# Patient Record
Sex: FEMALE | Race: WHITE | NOT HISPANIC OR LATINO | Employment: STUDENT | URBAN - METROPOLITAN AREA
[De-identification: names, ages, dates, MRNs, and addresses within clinical notes are randomized per-mention and may not be internally consistent; named-entity substitution may affect disease eponyms.]

---
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
## 2025-01-06 ENCOUNTER — APPOINTMENT (OUTPATIENT)
Dept: BEHAVIORAL HEALTH | Facility: CLINIC | Age: 20
End: 2025-01-06
Payer: COMMERCIAL

## 2025-01-07 ENCOUNTER — APPOINTMENT (OUTPATIENT)
Dept: BEHAVIORAL HEALTH | Facility: CLINIC | Age: 20
End: 2025-01-07
Payer: COMMERCIAL

## 2025-01-21 ENCOUNTER — APPOINTMENT (OUTPATIENT)
Dept: BEHAVIORAL HEALTH | Facility: CLINIC | Age: 20
End: 2025-01-21
Payer: COMMERCIAL

## 2025-03-10 ENCOUNTER — APPOINTMENT (OUTPATIENT)
Dept: BEHAVIORAL HEALTH | Facility: CLINIC | Age: 20
End: 2025-03-10
Payer: COMMERCIAL

## 2025-03-10 DIAGNOSIS — F32.89 OTHER SPECIFIED DEPRESSIVE EPISODES: ICD-10-CM

## 2025-03-10 DIAGNOSIS — F41.1 GAD (GENERALIZED ANXIETY DISORDER): Primary | ICD-10-CM

## 2025-03-10 PROCEDURE — 1036F TOBACCO NON-USER: CPT | Performed by: PSYCHIATRY & NEUROLOGY

## 2025-03-10 PROCEDURE — 99214 OFFICE O/P EST MOD 30 MIN: CPT | Performed by: PSYCHIATRY & NEUROLOGY

## 2025-03-10 RX ORDER — BUPROPION HYDROCHLORIDE 300 MG/1
300 TABLET ORAL EVERY MORNING
Qty: 90 TABLET | Refills: 0 | Status: SHIPPED | OUTPATIENT
Start: 2025-03-10 | End: 2025-06-08

## 2025-03-10 RX ORDER — FLUOXETINE 20 MG/1
TABLET ORAL
Qty: 33 TABLET | Refills: 0 | Status: SHIPPED | OUTPATIENT
Start: 2025-03-10 | End: 2025-04-15

## 2025-03-10 NOTE — PROGRESS NOTES
Outpatient Child and Adolescent Psychiatry    Yasmin Whitley, a 19 y.o. female, is seen for psychiatric medication management follow up. An interactive audio and video telecommunication system which permits real time communications between the patient (at the originating site) and provider (at the distant site) was utilized to provide this telehealth service.  Verbal consent was requested and obtained from Yasmin Whitley on this date, 03/10/25 for a telehealth visit and the patient's location was confirmed at the time of the visit.   Subjective   HPI:   Patient reports that she has been doing alright. Really likes college. Really likes her school and the friends she's made. Currently on Spring Break. Mood has been pretty good. Notes anxiety has been a little bit. Was really tired for a while. Went to doctor, iron was low. Started taking iron pills and got a little better. Was off of Lexapro for a while. Was having more energy during those three weeks, but anxiety was increased.  Took propranolol a few times during that period, but felt it was not enough. Sleep is fine when she goes to bed. Does get good sleep. Notes that she snores.  Has never had sleep evaluation. Tomorrow has an ADHD evaluation with Dr. Kelton Loo. Has noticed has been taking longer to get work done than peers. Appetite has been good. Eating three meals a day and some snacks. Hasn't been in counseling since she has been in school, but looking into possibly seeing her while she is in [place]. Denies self harming.  Denies suicidal or homicidal ideation, plan or intent.    Prev med trials: citalopram 30mg, buspirone 5mg, sertraline 75mg, hydroxyzine 25mg, aripiprazole 2mg (sedation), escitalopram 20 to 30 mg (sedation)  Counseling: TESSA Marquez at [facility] (previously Angélica Reich, PhD)  College: [employer] in [place] for education/ sociology (girlfriend also attends the same school)  Objective   Mental Status Exam:   General appearance: Fairly  groomed  Engagement: Well related  Psychomotor activity: No agitation or retardation  Speech and Language: Appropriate for age  Mood: Euthymic  Affect: Appropriate  Attention: Sustainable  Though process: Linear  Thought content: No current suicidal or homicidal ideations, plan or intent  Perceptual disturbances: None  Judgement and insight: Fair  Assessment/Plan   03/10/25: Patient is a 19 y.o. female who is seen for follow up. Patient currently on escitalopram 20mg PO daily, bupropion XL 300mg and propranolol 10mg PRN.  Patient reporting daytime sedation while on escitalopram.  Was off of escitalopram for about 3 weeks and felt like energy was increased, but also noticed increased anxiety during the same period of time.  Patient reports getting adequate sleep, but feeling tired throughout the day.  Notes that she snores at night and has not had sleep evaluation.  Iron supplementation was helpful to a certain extent.    Impression:   ALLYSON with panic attacks  Other specified depressive disorder  Disordered eating behaviors     Plan:   - Continue propranolol 10mg PO tid PRN for anxiety  - Decrease escitalopram to 10 mg p.o. daily for 6 days then discontinue  - Start fluoxetine 10 mg p.o. daily for 6 days then increase to 20 mg p.o. daily  - Continue bupropion XL 300mg; (increase risk of seizure in bulimia)  -[facility]  - Has ADHD evaluation tomorrow with Dr. Kelton Loo  - Previously requested diagnostic letter for school  - Highly recommended for patient to follow up with a psychiatry provider at [facility] within the next 4 to 6-week to monitor medication tolerance and response-patient expressed understanding and agreement      This note was partially transcribed by Dragon  voice recognition software. In spite of proofreading, errors may still exist.

## 2025-04-02 DIAGNOSIS — F32.89 OTHER SPECIFIED DEPRESSIVE EPISODES: ICD-10-CM

## 2025-04-02 DIAGNOSIS — F41.1 GAD (GENERALIZED ANXIETY DISORDER): ICD-10-CM

## 2025-04-02 RX ORDER — FLUOXETINE HYDROCHLORIDE 20 MG/1
20 CAPSULE ORAL DAILY
Qty: 90 CAPSULE | Refills: 0 | Status: SHIPPED | OUTPATIENT
Start: 2025-04-02 | End: 2025-07-01

## 2025-05-28 NOTE — PROGRESS NOTES
Subjective   Patient ID: Yasmin Whitley is a 19 y.o. female who presents for New Patient Visit.    PAP never  MAMM never  DEXA never  COLON never  GARDASIL done    New patient. G0. Goes to [employer]. Education and psychology.    H/O skin lesion removed from scalp, birthmark.  Anxiety and depression well controlled, sees psychiatrist.    Had a female partner for 3 years, not current.  Menarche 14. Always irregular 2-3 months, then a little better 1 1/2-2 months, December-April no period. It wasn't heavier than usual but periods are always heavy. Bleeds every 2 hours. Last 7 days. LMP was end of April. No nipple discharge. Some acne, no unusual hair growth. Always trouble losing weight but no change.     Takes iron in the morning.      Review of Systems   All other systems reviewed and are negative.      Objective   Constitutional: Alert and in no acute distress. Well developed, well nourished.  Neck: no neck asymmetry. Supple and thyroid not enlarged and there were no palpable thyroid nodules.  Chest: Breasts normal appearance, no nipple discharge and no skin changes and palpation of breasts and axillae: no palpable mass and no axillary lymphadenopathy.  Abdomen: soft nontender; no abdominal mass palpated, no organomegaly and no hernias.  Genitourinary: external genitalia: normal, no inguinal lymphadenopathy, Bartholin's urethral and [place]'s glands: normal, urethra: normal and bladder: normal on palpation.  Vagina: normal.  Cervix: normal.  Uterus: normal.   Right adnexa/parametria: normal.  Left adnexa/parametria: normal.  Skin: normal skin color and pigmentation, normal skin turgor and no rash.  Psychiatric: alert and oriented x 3, affect normal to patient baseline and mood appropriate.     Assessment/Plan   -Prolactin, TSH, CBC, ferritin, iron  -Discussed options for periods and patient would like to try OCPs, instructions given. Will start this Sunday. Discussed that she will likely have some BTB.  -pap at 21.    -Declines STDs.

## 2025-05-29 ENCOUNTER — APPOINTMENT (OUTPATIENT)
Dept: OBSTETRICS AND GYNECOLOGY | Facility: CLINIC | Age: 20
End: 2025-05-29
Payer: COMMERCIAL

## 2025-05-29 VITALS
BODY MASS INDEX: 29.98 KG/M2 | HEIGHT: 67 IN | SYSTOLIC BLOOD PRESSURE: 124 MMHG | DIASTOLIC BLOOD PRESSURE: 80 MMHG | WEIGHT: 191 LBS

## 2025-05-29 DIAGNOSIS — Z30.011 ENCOUNTER FOR INITIAL PRESCRIPTION OF CONTRACEPTIVE PILLS: Primary | ICD-10-CM

## 2025-05-29 DIAGNOSIS — N92.1 MENORRHAGIA WITH IRREGULAR CYCLE: ICD-10-CM

## 2025-05-29 DIAGNOSIS — Z01.419 WELL WOMAN EXAM WITH ROUTINE GYNECOLOGICAL EXAM: ICD-10-CM

## 2025-05-29 PROCEDURE — 99385 PREV VISIT NEW AGE 18-39: CPT | Performed by: OBSTETRICS & GYNECOLOGY

## 2025-05-29 PROCEDURE — 3008F BODY MASS INDEX DOCD: CPT | Performed by: OBSTETRICS & GYNECOLOGY

## 2025-05-29 PROCEDURE — 1036F TOBACCO NON-USER: CPT | Performed by: OBSTETRICS & GYNECOLOGY

## 2025-05-29 RX ORDER — ALBUTEROL SULFATE 90 UG/1
2 INHALANT RESPIRATORY (INHALATION) EVERY 4 HOURS PRN
COMMUNITY

## 2025-05-29 RX ORDER — FERROUS GLUCONATE 324(37.5)
324 TABLET ORAL EVERY OTHER DAY
COMMUNITY
Start: 2024-06-21

## 2025-05-29 RX ORDER — GABAPENTIN 100 MG/1
CAPSULE ORAL
COMMUNITY
Start: 2025-05-14

## 2025-05-29 RX ORDER — ALBUTEROL SULFATE 90 UG/1
INHALANT RESPIRATORY (INHALATION)
COMMUNITY

## 2025-05-29 RX ORDER — LISDEXAMFETAMINE DIMESYLATE 30 MG/1
30 CAPSULE ORAL
COMMUNITY
Start: 2025-03-28

## 2025-05-29 RX ORDER — HYDROXYZINE HYDROCHLORIDE 25 MG/1
TABLET, FILM COATED ORAL
COMMUNITY
Start: 2022-06-23

## 2025-05-29 RX ORDER — LEVONORGESTREL/ETHIN.ESTRADIOL 0.1-0.02MG
1 TABLET ORAL DAILY
Qty: 28 TABLET | Refills: 11 | Status: SHIPPED | OUTPATIENT
Start: 2025-05-29 | End: 2026-05-29

## 2025-05-30 LAB
ERYTHROCYTE [DISTWIDTH] IN BLOOD BY AUTOMATED COUNT: 12.6 % (ref 11–15)
FERRITIN SERPL-MCNC: 54 NG/ML (ref 16–154)
HCT VFR BLD AUTO: 42 % (ref 35–45)
HGB BLD-MCNC: 13.9 G/DL (ref 11.7–15.5)
IRON SATN MFR SERPL: 11 % (CALC) (ref 15–45)
IRON SERPL-MCNC: 38 MCG/DL (ref 27–164)
MCH RBC QN AUTO: 30.6 PG (ref 27–33)
MCHC RBC AUTO-ENTMCNC: 33.1 G/DL (ref 32–36)
MCV RBC AUTO: 92.5 FL (ref 80–100)
PLATELET # BLD AUTO: 313 THOUSAND/UL (ref 140–400)
PMV BLD REES-ECKER: 9.8 FL (ref 7.5–12.5)
PROLACTIN SERPL-MCNC: 6.9 NG/ML
RBC # BLD AUTO: 4.54 MILLION/UL (ref 3.8–5.1)
TIBC SERPL-MCNC: 340 MCG/DL (CALC) (ref 271–448)
TSH SERPL-ACNC: 2.87 MIU/L
WBC # BLD AUTO: 10.4 THOUSAND/UL (ref 3.8–10.8)

## 2025-06-22 DIAGNOSIS — F32.89 OTHER SPECIFIED DEPRESSIVE EPISODES: ICD-10-CM

## 2025-06-22 RX ORDER — BUPROPION HYDROCHLORIDE 300 MG/1
300 TABLET ORAL EVERY MORNING
Qty: 90 TABLET | Refills: 0 | Status: SHIPPED | OUTPATIENT
Start: 2025-06-22 | End: 2025-09-20